# Patient Record
Sex: MALE | Race: BLACK OR AFRICAN AMERICAN
[De-identification: names, ages, dates, MRNs, and addresses within clinical notes are randomized per-mention and may not be internally consistent; named-entity substitution may affect disease eponyms.]

---

## 2021-03-30 ENCOUNTER — HOSPITAL ENCOUNTER (EMERGENCY)
Dept: HOSPITAL 53 - M ED | Age: 21
Discharge: HOME | End: 2021-03-30
Payer: COMMERCIAL

## 2021-03-30 VITALS — SYSTOLIC BLOOD PRESSURE: 116 MMHG | DIASTOLIC BLOOD PRESSURE: 55 MMHG

## 2021-03-30 VITALS — WEIGHT: 164.46 LBS | BODY MASS INDEX: 26.43 KG/M2 | HEIGHT: 66 IN

## 2021-03-30 DIAGNOSIS — Z88.0: ICD-10-CM

## 2021-03-30 DIAGNOSIS — K80.51: Primary | ICD-10-CM

## 2021-03-30 LAB
ALBUMIN SERPL BCG-MCNC: 4.1 GM/DL (ref 3.2–5.2)
ALT SERPL W P-5'-P-CCNC: 175 U/L (ref 12–78)
APPEARANCE UR: CLEAR
BACTERIA UR QL AUTO: NEGATIVE
BILIRUB SERPL-MCNC: 1.6 MG/DL (ref 0.2–1)
BILIRUB UR QL STRIP.AUTO: NEGATIVE
BUN SERPL-MCNC: 13 MG/DL (ref 7–18)
CALCIUM SERPL-MCNC: 8.8 MG/DL (ref 8.5–10.1)
CHLORIDE SERPL-SCNC: 106 MEQ/L (ref 98–107)
CO2 SERPL-SCNC: 30 MEQ/L (ref 21–32)
CREAT SERPL-MCNC: 0.83 MG/DL (ref 0.7–1.3)
GFR SERPL CREATININE-BSD FRML MDRD: > 60 ML/MIN/{1.73_M2} (ref 60–?)
GLUCOSE SERPL-MCNC: 99 MG/DL (ref 70–100)
GLUCOSE UR QL STRIP.AUTO: NEGATIVE MG/DL
HBV CORE IGM SER QL: NEGATIVE
HBV SURFACE AB SER-ACNC: NEGATIVE M[IU]/ML
HCT VFR BLD AUTO: 41.8 % (ref 42–52)
HCV AB SER QL: 0 INDEX (ref ?–0.8)
HEPATITIS A ANTIBODY IGM: NEGATIVE
HGB BLD-MCNC: 14.2 G/DL (ref 13.5–17.5)
HGB UR QL STRIP.AUTO: NEGATIVE
KETONES UR QL STRIP.AUTO: NEGATIVE MG/DL
LEUKOCYTE ESTERASE UR QL STRIP.AUTO: NEGATIVE
LIPASE SERPL-CCNC: 78 U/L (ref 73–393)
MCH RBC QN AUTO: 29.5 PG (ref 27–33)
MCHC RBC AUTO-ENTMCNC: 34 G/DL (ref 32–36.5)
MCV RBC AUTO: 86.7 FL (ref 80–96)
MUCOUS THREADS URNS QL MICRO: (no result)
NITRITE UR QL STRIP.AUTO: NEGATIVE
PH UR STRIP.AUTO: 6 UNITS (ref 5–9)
PLATELET # BLD AUTO: 180 10^3/UL (ref 150–450)
POTASSIUM SERPL-SCNC: 4.2 MEQ/L (ref 3.5–5.1)
PROT SERPL-MCNC: 7.6 GM/DL (ref 6.4–8.2)
PROT UR QL STRIP.AUTO: NEGATIVE MG/DL
RBC # BLD AUTO: 4.82 10^6/UL (ref 4.3–6.1)
RBC # UR AUTO: 0 /HPF (ref 0–3)
SODIUM SERPL-SCNC: 140 MEQ/L (ref 136–145)
SP GR UR STRIP.AUTO: 1.01 (ref 1–1.03)
SQUAMOUS #/AREA URNS AUTO: 0 /HPF (ref 0–6)
UROBILINOGEN UR QL STRIP.AUTO: 4 MG/DL (ref 0–2)
WBC # BLD AUTO: 12.8 10^3/UL (ref 4–10)
WBC #/AREA URNS AUTO: 8 /HPF (ref 0–3)

## 2021-03-30 PROCEDURE — 96374 THER/PROPH/DIAG INJ IV PUSH: CPT

## 2021-03-30 PROCEDURE — 83690 ASSAY OF LIPASE: CPT

## 2021-03-30 PROCEDURE — 86709 HEPATITIS A IGM ANTIBODY: CPT

## 2021-03-30 PROCEDURE — 96375 TX/PRO/DX INJ NEW DRUG ADDON: CPT

## 2021-03-30 PROCEDURE — 86705 HEP B CORE ANTIBODY IGM: CPT

## 2021-03-30 PROCEDURE — 81001 URINALYSIS AUTO W/SCOPE: CPT

## 2021-03-30 PROCEDURE — 87340 HEPATITIS B SURFACE AG IA: CPT

## 2021-03-30 PROCEDURE — 96361 HYDRATE IV INFUSION ADD-ON: CPT

## 2021-03-30 PROCEDURE — 80053 COMPREHEN METABOLIC PANEL: CPT

## 2021-03-30 PROCEDURE — 86803 HEPATITIS C AB TEST: CPT

## 2021-03-30 PROCEDURE — 85027 COMPLETE CBC AUTOMATED: CPT

## 2021-03-30 PROCEDURE — 76705 ECHO EXAM OF ABDOMEN: CPT

## 2021-03-30 PROCEDURE — 99284 EMERGENCY DEPT VISIT MOD MDM: CPT

## 2021-03-30 NOTE — REPVR
PROCEDURE INFORMATION: 

Exam: US Abdomen, Limited; Right Upper Quadrant 

Exam date and time: 3/30/2021 6:40 AM 

Age: 21 years old 

Clinical indication: Abdominal pain; Acute; Additional info: Right upper 

quadrant pain, eval for biliary etiology 



TECHNIQUE: 

Imaging protocol: US abdomen. Real time ultrasound with image documentation. 

Limited exam focused on the right upper quadrant. 



COMPARISON: 

No relevant prior studies available. 



FINDINGS: 

Liver: Unremarkable. No mass. 

Gallbladder: Gallbladder is partially contracted, consistent with nonfasting 

state. Shadowing echogenic foci along the gallbladder wall are not clearly 

intraluminal and may represent bowel gas artifact. No significant gallbladder 

wall thickening or pericholecystic fluid. 

Common bile duct: Bile ducts are normal in caliber. CBD 4-5 mm. 

Pancreas: Visualized pancreas is unremarkable. 

Right kidney: Right kidney measures 11.2 cm in length and is unremarkable. 



Intraperitoneal space: No free fluid. 



IMPRESSION: 

Partially contracted gallbladder with shadowing echogenic foci along the 

gallbladder wall. Although these may represent gallstones, these are not 

clearly intraluminal and may represent bowel gas artifact. Repeat focused 

ultrasound after appropriate fasting is recommended. 



Electronically signed by: Kalyan Liu On 03/30/2021  07:40:47 AM

## 2021-05-24 ENCOUNTER — HOSPITAL ENCOUNTER (OUTPATIENT)
Dept: HOSPITAL 53 - M RAD | Age: 21
End: 2021-05-24
Attending: SURGERY
Payer: COMMERCIAL

## 2021-05-24 DIAGNOSIS — R10.11: Primary | ICD-10-CM

## 2021-05-24 PROCEDURE — 76705 ECHO EXAM OF ABDOMEN: CPT

## 2021-05-24 PROCEDURE — 78227 HEPATOBIL SYST IMAGE W/DRUG: CPT

## 2021-05-24 NOTE — REP
INDICATION:

RUQ PAIN  US 1ST AND NM 2ND.



COMPARISON:

None.



TECHNIQUE/RADIOTRACER AND DOSE:

6.6 mCi of Technetium-99m mebrofenin was injected and sequential anterior images are

acquired.  65 minutes after the mebrofenin injection, the patient consumed 8 ounces

Ensure and an additional 60 minutes of imaging was acquired.  Regions of interest are

plotted around the gallbladder.



FINDINGS:

The initial hepatocellular parenchymal uptake phase is normal and homogeneous.  Intra-

and extra-hepatic bile ducts are labeled by the 10-minute image.  The gallbladder is

first labeled on the 10-minute image.  There is normal washout from the liver

parenchyma into the gallbladder and small intestine on subsequent images.  The

gallbladder ejection fraction is 77%.  Values greater than 35% are considered normal

with this technique.



IMPRESSION:

Normal hepatobiliary scan and normal gallbladder ejection fraction.





<Electronically signed by Oracio Garcia > 05/24/21 [id number]

## 2021-05-24 NOTE — REP
INDICATION:

RUQ PAIN  US 1ST NM 2ND.



COMPARISON:

Comparison sonography March 30, 2021..



TECHNIQUE:

Right upper quadrant sonography.



FINDINGS:

Scanning through the right upper quadrant of the abdomen demonstrates a normal sized,

thin-walled gallbladder without evidence of stone or polyp.  Common bile duct is

normal measuring 0.3 cm in greatest diameter.  No focal liver lesion is seen.  Liver

size is normal.  No pancreatic abnormality is observed.  The renal pyramids are

somewhat echogenic which may reflect hydration state.  No definite right renal

abnormality is seen.  There is no evidence of ascites.  The right kidney measures 10.8

x 5.5 x 5.4 cm.



IMPRESSION:

Negative right upper quadrant sonography.





<Electronically signed by Oracio Garcia > 05/24/21 [id number]

## 2022-11-20 ENCOUNTER — HOSPITAL ENCOUNTER (EMERGENCY)
Dept: HOSPITAL 53 - M ED | Age: 22
Discharge: HOME | End: 2022-11-20
Payer: COMMERCIAL

## 2022-11-20 VITALS — BODY MASS INDEX: 27.28 KG/M2 | HEIGHT: 66 IN | WEIGHT: 169.76 LBS

## 2022-11-20 VITALS — SYSTOLIC BLOOD PRESSURE: 118 MMHG | DIASTOLIC BLOOD PRESSURE: 66 MMHG

## 2022-11-20 DIAGNOSIS — J02.0: ICD-10-CM

## 2022-11-20 DIAGNOSIS — H66.92: Primary | ICD-10-CM

## 2022-11-20 DIAGNOSIS — Z88.0: ICD-10-CM

## 2022-11-20 LAB
BUN SERPL-MCNC: 9 MG/DL (ref 9–23)
CALCIUM SERPL-MCNC: 8.5 MG/DL (ref 8.5–10.1)
CHLORIDE SERPL-SCNC: 103 MMOL/L (ref 98–107)
CO2 SERPL-SCNC: 26 MMOL/L (ref 20–31)
CREAT SERPL-MCNC: 0.82 MG/DL (ref 0.7–1.3)
CRP SERPL-MCNC: 1.9 MG/DL (ref ?–1)
ERYTHROCYTE [SEDIMENTATION RATE] IN BLOOD BY WESTERGREN METHOD: 49 MM/HR (ref 0–15)
GFR SERPL CREATININE-BSD FRML MDRD: > 60 ML/MIN/{1.73_M2} (ref 60–?)
GLUCOSE SERPL-MCNC: 88 MG/DL (ref 60–100)
HCT VFR BLD AUTO: 45.1 % (ref 42–52)
HGB BLD-MCNC: 14.8 G/DL (ref 13.5–17.5)
MCH RBC QN AUTO: 28.9 PG (ref 27–33)
MCHC RBC AUTO-ENTMCNC: 32.8 G/DL (ref 32–36.5)
MCV RBC AUTO: 88.1 FL (ref 80–96)
PLATELET # BLD AUTO: 275 10^3/UL (ref 150–450)
POTASSIUM SERPL-SCNC: 4.1 MMOL/L (ref 3.5–5.1)
RBC # BLD AUTO: 5.12 10^6/UL (ref 4.3–6.1)
SODIUM SERPL-SCNC: 140 MMOL/L (ref 136–145)
WBC # BLD AUTO: 9 10^3/UL (ref 4–10)

## 2022-11-20 PROCEDURE — 85652 RBC SED RATE AUTOMATED: CPT

## 2022-11-20 PROCEDURE — 80048 BASIC METABOLIC PNL TOTAL CA: CPT

## 2022-11-20 PROCEDURE — 99283 EMERGENCY DEPT VISIT LOW MDM: CPT

## 2022-11-20 PROCEDURE — 87880 STREP A ASSAY W/OPTIC: CPT

## 2022-11-20 PROCEDURE — 96374 THER/PROPH/DIAG INJ IV PUSH: CPT

## 2022-11-20 PROCEDURE — 85027 COMPLETE CBC AUTOMATED: CPT

## 2022-11-20 PROCEDURE — 86140 C-REACTIVE PROTEIN: CPT
